# Patient Record
Sex: FEMALE | Race: WHITE | NOT HISPANIC OR LATINO
[De-identification: names, ages, dates, MRNs, and addresses within clinical notes are randomized per-mention and may not be internally consistent; named-entity substitution may affect disease eponyms.]

---

## 2022-10-13 ENCOUNTER — TRANSCRIPTION ENCOUNTER (OUTPATIENT)
Age: 72
End: 2022-10-13

## 2022-10-14 PROBLEM — Z00.00 ENCOUNTER FOR PREVENTIVE HEALTH EXAMINATION: Status: ACTIVE | Noted: 2022-10-14

## 2022-10-26 ENCOUNTER — APPOINTMENT (OUTPATIENT)
Dept: PAIN MANAGEMENT | Facility: CLINIC | Age: 72
End: 2022-10-26

## 2022-10-26 VITALS
HEIGHT: 67 IN | WEIGHT: 123 LBS | BODY MASS INDEX: 19.3 KG/M2 | OXYGEN SATURATION: 99 % | SYSTOLIC BLOOD PRESSURE: 122 MMHG | DIASTOLIC BLOOD PRESSURE: 67 MMHG | HEART RATE: 95 BPM

## 2022-10-26 PROCEDURE — 99214 OFFICE O/P EST MOD 30 MIN: CPT

## 2022-10-26 NOTE — REVIEW OF SYSTEMS
[Back Pain] : back pain [Neck Pain] : neck pain [Decreased ROM] : decreased range of motion [Weakness] : weakness [Negative] : Heme/Lymph

## 2022-10-26 NOTE — PHYSICAL EXAM
[de-identified] : General: Well-developed and well-nourished.  No acute distress.\par Psychiatric: Behavior was cooperative \par Head:  Normocephalic and atraumatic\par Eyes:  Sclera white. Conjunctiva and eyelids pink and moist without discharge.\par Cardiovascular:  Regular\par Respiratory:  Trachea midline. Normal effort.\par No accessory muscle use with respiration\par Abdomen: Non distended, soft and nontender\par Skin:  No rashes, ulcers, or lesions appreciated.\par Back  here is pain with extension,   painful abd limited ROM\par Extremities: No edema \par Musculoskeletal: Moving all extremities freely\par SLR positive RIGHT\par Neuro: CN 2-12 Grossly intact\par Sensation to light touch is intact extremities\par Gait: Ambulates with RW assistive device.

## 2022-10-26 NOTE — ASSESSMENT
[FreeTextEntry1] : Ms. TORRES BARRY is a 72 year F suffering from low back and right pain, that based upon today's subjective complaints, physical examination, and imaging review, is likely secondary to lumbar radiculopahy\par \par >> Medications\par \par Chronic opioid use for non-malignant pain, in particular at high doses would not be recommended since it can potentially lead to hyperalgesia (hypersensitivity), tolerance and addiction. \par  \par Gabapentin 200 mg p.o. with dinner\par \par Continue with Tylenol 650 mg orally every 6 hours as needed \par \par  \par >> Interventions\par \par Arrange for RIGHT L3/4 interlaminar epidural steroid injection under fluoroscopic guidance. Success rate and possible complications discussed with patient in detail. \par \par  \par >> Therapy and Other Modalities\par  \par Continue physical therapy, out of bed as tolerated \par \par >> Imaging and Other Studies\par \par See Media \par \par >> Consults\par \par None ordered

## 2022-10-26 NOTE — HISTORY OF PRESENT ILLNESS
[Back Pain] : back pain [Constant] : constant [4] : a minimum pain level of 4/10 [6] : a maximum pain level of 6/10 [Throbbing] : throbbing [Laying] : laying [FreeTextEntry1] : HPI - Ms. TORRES BARRY is a 72 year F with PHx as below, referred by Dr Dao who presents today with chief complaint of low back and right leg pain pain. Reports that it developed several weeks ago. It is located lumbar spine;  there is radiation of the pain into the ight hip and leg. The pain is presently 7/10 in severity on the numerical rating scale. It is sharp and burning in nature. The pain is constant and progressive. There is diurnal worsening, during the night. The pain is aggravated with standing, walking. The pain improves with rest. The pain is functionally and emotionally disabling for the patient as its preventing them from going about activities of daily living, such as routine housework. The pain does impair the patient’s ability to sleep. \par \par Patient has not been seen by pain management in the past.\par Patient attests to 6 weeks of provider directed treatment, including Physical Therapy at [facility]\par Patient reports treatment that occurred within the last 3 months\par Patient reports that symptoms have been persistent and and progressing after treatment\par  \par Reports there is no present numbness, there s no weakness. Patient denies any bowel/bladder incontinence, no saddle/perineal anesthesia or any other red flag signs or symptoms. Reports regular BMs.\par   [FreeTextEntry2] : 18 [FreeTextEntry7] : Referred by [facility] ED.  Patient presents with right back and neck pain. [de-identified] : tingling [FreeTextEntry3] : MOVING [FreeTextEntry4] : PT

## 2022-10-26 NOTE — DATA REVIEWED
[FreeTextEntry1] : \par \par Radiology \par CT Scan \par CT lumbar spine without contrast \par \par INDICATION: Lumbar back pain \par \par TECHNIQUE: CT examination lumbar spine performed without contrast. Multiple \par axial images obtained from the thoracolumbar junction through the sacrum. \par Sagittal/coronal reformatted images were acquired. Images reviewed in soft \par tissue and bone windows. \par \par COMPARISON: None available \par \par  FINDINGS: \par \par \par For the purposes of this report, 5 nonrib-bearing lumbar-type vertebral bodies \par are present in the inferior most well-formed intervertebral disc space will be \par designated as L5-S1. Vertebral body denoted as L5 is at the level of the \par iliolumbar ligament. No CT evidence for transitional lumbosacral anatomy. \par \par Lumbar lordosis is maintained. Moderate levocurvature centered at mid lumbar \par spine. There is right lateral listhesis of L1-L2, left lateral listhesis of \par L4-L5. Lumbar vertebral body heights are maintained. Multilevel moderate \par degenerative endplate changes with mild endplate sclerosis, multilevel \par Schmorl's nodes, intervertebral disc space narrowing. No evidence of \par spondylolysis. There are partially imaged hypodense lesions within the \par kidneys, nonspecific incompletely characterized may reflect benign cystic \par lesions. Scattered atherosclerotic calcifications of the abdominal aorta. No \par acute fracture identified and lumbar spine. \par \par Multilevel findings as follows: \par \par T11-12: No significant osseous canal or foraminal stenosis. \par \par T12-L1: No significant osseous canal or foraminal stenosis. \par \par L1-L2: No significant osseous canal or foraminal stenosis. \par \par L2-L3: No significant osseous canal or foraminal stenosis. \par \par L3-L4: Disc bulge, bilateral facet arthropathy, ligamentous hypertrophy \par contributing to mild to moderate canal stenosis and mild to moderate bilateral \par foraminal stenosis. \par \par L4-L5: Disc bulge, bilateral facet arthropathy, ligamentous hypertrophy \par contributing to moderate canal stenosis and moderate to severe bilateral \par foraminal stenosis. \par \par L5-S1: Disc bulge, bilateral facet arthropathy, ligamentous hypertrophy \par contributing to no significant canal stenosis and moderate bilateral foraminal \par stenosis. \par \par IMPRESSION: \par \par No acute fracture identified in the lumbar spine. \par \par Multilevel spondylitic changes of the lumbar spine as detailed above notable \par for moderate canal stenosis at L4-L5 and moderate to severe bilateral \par foraminal stenosis at L4-L5 and L5-S1.  rolling walker (5 inch wheels)

## 2022-11-03 ENCOUNTER — APPOINTMENT (OUTPATIENT)
Dept: PAIN MANAGEMENT | Facility: HOSPITAL | Age: 72
End: 2022-11-03

## 2022-11-03 ENCOUNTER — TRANSCRIPTION ENCOUNTER (OUTPATIENT)
Age: 72
End: 2022-11-03

## 2022-11-23 ENCOUNTER — APPOINTMENT (OUTPATIENT)
Dept: PAIN MANAGEMENT | Facility: CLINIC | Age: 72
End: 2022-11-23

## 2022-11-23 VITALS
BODY MASS INDEX: 19.46 KG/M2 | WEIGHT: 124 LBS | SYSTOLIC BLOOD PRESSURE: 107 MMHG | DIASTOLIC BLOOD PRESSURE: 69 MMHG | HEART RATE: 82 BPM | HEIGHT: 67 IN | OXYGEN SATURATION: 98 %

## 2022-11-23 PROCEDURE — 99214 OFFICE O/P EST MOD 30 MIN: CPT

## 2022-11-23 NOTE — DATA REVIEWED
[FreeTextEntry1] : \par \par Radiology \par CT Scan \par CT lumbar spine without contrast \par \par INDICATION: Lumbar back pain \par \par TECHNIQUE: CT examination lumbar spine performed without contrast. Multiple \par axial images obtained from the thoracolumbar junction through the sacrum. \par Sagittal/coronal reformatted images were acquired. Images reviewed in soft \par tissue and bone windows. \par \par COMPARISON: None available \par \par  FINDINGS: \par \par \par For the purposes of this report, 5 nonrib-bearing lumbar-type vertebral bodies \par are present in the inferior most well-formed intervertebral disc space will be \par designated as L5-S1. Vertebral body denoted as L5 is at the level of the \par iliolumbar ligament. No CT evidence for transitional lumbosacral anatomy. \par \par Lumbar lordosis is maintained. Moderate levocurvature centered at mid lumbar \par spine. There is right lateral listhesis of L1-L2, left lateral listhesis of \par L4-L5. Lumbar vertebral body heights are maintained. Multilevel moderate \par degenerative endplate changes with mild endplate sclerosis, multilevel \par Schmorl's nodes, intervertebral disc space narrowing. No evidence of \par spondylolysis. There are partially imaged hypodense lesions within the \par kidneys, nonspecific incompletely characterized may reflect benign cystic \par lesions. Scattered atherosclerotic calcifications of the abdominal aorta. No \par acute fracture identified and lumbar spine. \par \par Multilevel findings as follows: \par \par T11-12: No significant osseous canal or foraminal stenosis. \par \par T12-L1: No significant osseous canal or foraminal stenosis. \par \par L1-L2: No significant osseous canal or foraminal stenosis. \par \par L2-L3: No significant osseous canal or foraminal stenosis. \par \par L3-L4: Disc bulge, bilateral facet arthropathy, ligamentous hypertrophy \par contributing to mild to moderate canal stenosis and mild to moderate bilateral \par foraminal stenosis. \par \par L4-L5: Disc bulge, bilateral facet arthropathy, ligamentous hypertrophy \par contributing to moderate canal stenosis and moderate to severe bilateral \par foraminal stenosis. \par \par L5-S1: Disc bulge, bilateral facet arthropathy, ligamentous hypertrophy \par contributing to no significant canal stenosis and moderate bilateral foraminal \par stenosis. \par \par IMPRESSION: \par \par No acute fracture identified in the lumbar spine. \par \par Multilevel spondylitic changes of the lumbar spine as detailed above notable \par for moderate canal stenosis at L4-L5 and moderate to severe bilateral \par foraminal stenosis at L4-L5 and L5-S1.

## 2022-11-23 NOTE — ASSESSMENT
[FreeTextEntry1] : Ms. TORRES BARRY is a 72 year F suffering from low back and right pain, that based upon today's subjective complaints, physical examination, and imaging review, is likely secondary to lumbar radiculopahy\par \par >> Medications\par \par Chronic opioid use for non-malignant pain, in particular at high doses would not be recommended since it can potentially lead to hyperalgesia (hypersensitivity), tolerance and addiction. \par  \par Gabapentin 200 mg p.o. with dinner\par \par Continue with Tylenol 650 mg orally every 6 hours as needed \par \par  \par >> Interventions\par \par Good relief following JEREMY earlier this month\par  \par >> Therapy and Other Modalities\par  \par Continue physical therapy, out of bed as tolerated \par \par >> Imaging and Other Studies\par \par See Media \par \par >> Consults\par \par None ordered

## 2022-11-23 NOTE — HISTORY OF PRESENT ILLNESS
[Back Pain] : back pain [7] : a current pain level of 7/10 [FreeTextEntry1] : Interval Note:\par \par Since last visit the pain intensity has improved significantly\par \par S/p RIGHT L3/4 ILESI November 3rd 2022 with report of excellent relief (80%)\par \par Since then has been carrying on with PT\par \par Medication has been allowing patient to go about activities of daily living with less pain.\par \par Moving bowels regularly. No recent falls. \par \par Denies weakness, numbness. Patient denies any bowel/bladder incontinence, no saddle/perineal anesthesia or any other red flag signs or symptoms.\par \par \par \par \par --HPI - Ms. TORRES BARRY is a 72 year F with PHx as below, referred by Dr Dao who presents today with chief complaint of low back and right leg pain pain. Reports that it developed several weeks ago. It is located lumbar spine;  there is radiation of the pain into the ight hip and leg. The pain is presently 7/10 in severity on the numerical rating scale. It is sharp and burning in nature. The pain is constant and progressive. There is diurnal worsening, during the night. The pain is aggravated with standing, walking. The pain improves with rest. The pain is functionally and emotionally disabling for the patient as its preventing them from going about activities of daily living, such as routine housework. The pain does impair the patient’s ability to sleep. \par \par Patient has not been seen by pain management in the past.\par Patient attests to 6 weeks of provider directed treatment, [facility]\par Patient reports treatment that occurred within the last 3 months\par Patient reports that symptoms have been persistent and and progressing after treatment\par  \par Reports there is no present numbness, there s no weakness. Patient denies any bowel/bladder incontinence, no saddle/perineal anesthesia or any other red flag signs or symptoms. Reports regular BMs.\par

## 2022-11-23 NOTE — PHYSICAL EXAM
[de-identified] : General: Well-developed and well-nourished.  No acute distress.\par Psychiatric: Behavior was cooperative \par Head:  Normocephalic and atraumatic\par Eyes:  Sclera white. Conjunctiva and eyelids pink and moist without discharge.\par Cardiovascular:  Regular\par Respiratory:  Trachea midline. Normal effort.\par No accessory muscle use with respiration\par Abdomen: Non distended, soft and nontender\par Skin:  No rashes, ulcers, or lesions appreciated.\par Back  here is pain with extension,   painful abd limited ROM\par Extremities: No edema \par Musculoskeletal: Moving all extremities freely \par Neuro: CN 2-12 Grossly intact\par Sensation to light touch is intact extremities\par Gait: Ambulates with RW assistive device.

## 2022-11-23 NOTE — REVIEW OF SYSTEMS
[Back Pain] : back pain [Muscle Pain] : muscle pain [Radiating Pain] : radiating pain [Joint Stiffness] : joint stiffness [Negative] : Heme/Lymph

## 2022-12-06 ENCOUNTER — TRANSCRIPTION ENCOUNTER (OUTPATIENT)
Age: 72
End: 2022-12-06

## 2023-01-04 ENCOUNTER — APPOINTMENT (OUTPATIENT)
Dept: PAIN MANAGEMENT | Facility: CLINIC | Age: 73
End: 2023-01-04
Payer: MEDICARE

## 2023-01-04 VITALS
HEART RATE: 95 BPM | SYSTOLIC BLOOD PRESSURE: 108 MMHG | OXYGEN SATURATION: 99 % | BODY MASS INDEX: 19.62 KG/M2 | WEIGHT: 125 LBS | HEIGHT: 67 IN | DIASTOLIC BLOOD PRESSURE: 54 MMHG

## 2023-01-04 PROCEDURE — 99213 OFFICE O/P EST LOW 20 MIN: CPT

## 2023-01-04 NOTE — HISTORY OF PRESENT ILLNESS
[Back Pain] : back pain [FreeTextEntry1] : Interval Note:\par \par Since last visit the pain intensity has improved significantly\par \par Moving bowels regularly. No recent falls. \par \par Denies weakness, numbness. Patient denies any bowel/bladder incontinence, no saddle/perineal anesthesia or any other red flag signs or symptoms.\par \par --\par S/p RIGHT L3/4 ILESI November 3rd 2022 with report of excellent relief (80%)\par --\par \par \par --HPI - Ms. TORRES BARRY is a 72 year F with PHx as below, referred by Dr Dao who presents today with chief complaint of low back and right leg pain pain. Reports that it developed several weeks ago. It is located lumbar spine;  there is radiation of the pain into the ight hip and leg. The pain is presently 7/10 in severity on the numerical rating scale. It is sharp and burning in nature. The pain is constant and progressive. There is diurnal worsening, during the night. The pain is aggravated with standing, walking. The pain improves with rest. The pain is functionally and emotionally disabling for the patient as its preventing them from going about activities of daily living, such as routine housework. The pain does impair the patient’s ability to sleep. \par \par Patient has not been seen by pain management in the past.\par Patient attests to 6 weeks of provider directed treatment, including Physical Therapy at [facility]\par Patient reports treatment that occurred within the last 3 months\par Patient reports that symptoms have been persistent and and progressing after treatment\par  \par Reports there is no present numbness, there s no weakness. Patient denies any bowel/bladder incontinence, no saddle/perineal anesthesia or any other red flag signs or symptoms. Reports regular BMs.\par

## 2023-01-04 NOTE — ASSESSMENT
[FreeTextEntry1] : Ms. TORRES BARRY is a 72 year F suffering from low back and right pain, that based upon today's subjective complaints, physical examination, and imaging review, is likely secondary to lumbar radiculopahy\par \par >> Medications\par \par Chronic opioid use for non-malignant pain, in particular at high doses would not be recommended since it can potentially lead to hyperalgesia (hypersensitivity), tolerance and addiction. \par  \par Gabapentin 200 mg p.o. with dinner\par \par Continue with Tylenol 650 mg orally every 6 hours as needed \par \par  \par >> Interventions\par None indicated at this time, consider repeat epidural steroid injection if pain recurs.\par \par  \par >> Therapy and Other Modalities\par  \par Continue physical therapy, out of bed as tolerated \par \par >> Imaging and Other Studies\par \par See Media \par \par >> Consults\par \par None ordered

## 2023-01-04 NOTE — REVIEW OF SYSTEMS
[Back Pain] : back pain [Radiating Pain] : radiating pain [Joint Stiffness] : joint stiffness [Decreased ROM] : decreased range of motion [Negative] : Heme/Lymph

## 2023-01-04 NOTE — PHYSICAL EXAM
[de-identified] : General: Well-developed and well-nourished.  No acute distress.\par Psychiatric: Behavior was cooperative \par Head:  Normocephalic and atraumatic\par Eyes:  Sclera white. Conjunctiva and eyelids pink and moist without discharge.\par Cardiovascular:  Regular\par Respiratory:  Trachea midline. Normal effort.\par No accessory muscle use with respiration\par Abdomen: Non distended, soft and nontender\par Skin:  No rashes, ulcers, or lesions appreciated.\par Back range of motion improved, within normal limits\par Extremities: No edema \par Musculoskeletal: Moving all extremities freely \par Neuro: CN 2-12 Grossly intact\par Sensation to light touch is intact extremities\par Gait: Ambulates with NO assistive device.

## 2023-05-17 ENCOUNTER — APPOINTMENT (OUTPATIENT)
Dept: PAIN MANAGEMENT | Facility: CLINIC | Age: 73
End: 2023-05-17
Payer: MEDICARE

## 2023-05-17 VITALS
BODY MASS INDEX: 19.62 KG/M2 | SYSTOLIC BLOOD PRESSURE: 116 MMHG | OXYGEN SATURATION: 95 % | HEART RATE: 85 BPM | DIASTOLIC BLOOD PRESSURE: 68 MMHG | WEIGHT: 125 LBS | HEIGHT: 67 IN

## 2023-05-17 PROCEDURE — 99214 OFFICE O/P EST MOD 30 MIN: CPT

## 2023-05-17 NOTE — ASSESSMENT
[FreeTextEntry1] : Ms. TORRES BARRY is a 72 year F suffering from low back and right pain, that based upon today's subjective complaints, physical examination, and imaging review, is likely secondary to lumbar radiculopathy\par \par >> Medications\par \par Chronic opioid use for non-malignant pain, in particular at high doses would not be recommended since it can potentially lead to hyperalgesia (hypersensitivity), tolerance and addiction. \par  \par Gabapentin 200 mg p.o. with dinner\par \par Continue with Tylenol 650 mg orally every 6 hours as needed \par \par  \par >> Interventions\par \par Arrange for Right L3/4 interlaminar epidural steroid injection under fluoroscopic guidance. Success rate and possible complications discussed with patient in detail. \par  \par >> Therapy and Other Modalities\par  \par Continue physical therapy, out of bed as tolerated \par \par >> Imaging and Other Studies\par \par See Media \par \par >> Consults\par \par None ordered

## 2023-05-17 NOTE — PHYSICAL EXAM
[de-identified] : General: Well-developed and well-nourished.  No acute distress.\par Psychiatric: Behavior was cooperative \par Head:  Normocephalic and atraumatic\par Eyes:  Sclera white. Conjunctiva and eyelids pink and moist without discharge.\par Cardiovascular:  Regular\par Respiratory:  Trachea midline. Normal effort.\par No accessory muscle use with respiration\par Abdomen: Non distended, soft and nontender\par Skin:  No rashes, ulcers, or lesions appreciated.\par Back range of motion improved, within normal limits\par Extremities: No edema \par Musculoskeletal: Moving all extremities freely \par Neuro: CN 2-12 Grossly intact\par Sensation to light touch is intact extremities\par Gait: Ambulates with NO assistive device.

## 2023-05-17 NOTE — HISTORY OF PRESENT ILLNESS
[0] : a current pain level of 0/10 [FreeTextEntry1] : Interval Note:\par \par Since last visit the pain intensity has started to recur in the right lumbar spine down right leg at times\par \par Moving bowels regularly. No recent falls. \par \par Occasionally suffering from paresthesia in her legs\par \par  Patient denies any bowel/bladder incontinence, no saddle/perineal anesthesia or any other red flag signs or symptoms.\par \par --\par S/p RIGHT L3/4 ILESI November 3rd 2022 with report of excellent relief (80%)\par --\par \par \par --HPI - Ms. TORRES BARRY is a 72 year F with PHx as below, referred by Dr Dao who presents today with chief complaint of low back and right leg pain pain. Reports that it developed several weeks ago. It is located lumbar spine;  there is radiation of the pain into the ight hip and leg. The pain is presently 7/10 in severity on the numerical rating scale. It is sharp and burning in nature. The pain is constant and progressive. There is diurnal worsening, during the night. The pain is aggravated with standing, walking. The pain improves with rest. The pain is functionally and emotionally disabling for the patient as its preventing them from going about activities of daily living, such as routine housework. The pain does impair the patient’s ability to sleep. \par \par Patient has not been seen by pain management in the past.\par Patient attests to 6 weeks of provider directed treatment, [facility]\par Patient reports treatment that occurred within the last 3 months\par Patient reports that symptoms have been persistent and and progressing after treatment\par  \par Reports there is no present numbness, there s no weakness. Patient denies any bowel/bladder incontinence, no saddle/perineal anesthesia or any other red flag signs or symptoms. Reports regular BMs.\par

## 2023-05-17 NOTE — REVIEW OF SYSTEMS
[Back Pain] : back pain [Muscle Pain] : muscle pain [Radiating Pain] : radiating pain [Decreased ROM] : decreased range of motion [Negative] : Heme/Lymph

## 2023-06-01 ENCOUNTER — TRANSCRIPTION ENCOUNTER (OUTPATIENT)
Age: 73
End: 2023-06-01

## 2023-06-01 ENCOUNTER — APPOINTMENT (OUTPATIENT)
Dept: PAIN MANAGEMENT | Facility: HOSPITAL | Age: 73
End: 2023-06-01

## 2023-07-07 ENCOUNTER — APPOINTMENT (OUTPATIENT)
Dept: PAIN MANAGEMENT | Facility: CLINIC | Age: 73
End: 2023-07-07
Payer: MEDICARE

## 2023-07-07 VITALS
DIASTOLIC BLOOD PRESSURE: 69 MMHG | HEIGHT: 67 IN | SYSTOLIC BLOOD PRESSURE: 147 MMHG | HEART RATE: 91 BPM | OXYGEN SATURATION: 98 % | WEIGHT: 125 LBS | BODY MASS INDEX: 19.62 KG/M2

## 2023-07-07 PROCEDURE — 99213 OFFICE O/P EST LOW 20 MIN: CPT

## 2023-07-07 NOTE — REVIEW OF SYSTEMS
[Back Pain] : back pain [Muscle Pain] : muscle pain [Decreased ROM] : decreased range of motion [Negative] : Heme/Lymph

## 2023-07-07 NOTE — HISTORY OF PRESENT ILLNESS
[0] : a current pain level of 0/10 [Back Pain] : back pain [FreeTextEntry1] : Interval Note:\par \par Patient underwent right-sided L3-4 interlaminar epidural steroid injection on June 1, 2023 with report of 95 % relief - expressing gratitude\par \par Occasionally suffering from paresthesia in her legs\par \par  Patient denies any bowel/bladder incontinence, no saddle/perineal anesthesia or any other red flag signs or symptoms.\par \par --\par S/p RIGHT L3/4 ILESI November 3rd 2022 with report of excellent relief (80%)\par --\par \par \par --HPI - Ms. TORRES BARRY is a 72 year F with PHx as below, referred by Dr Dao who presents today with chief complaint of low back and right leg pain pain. Reports that it developed several weeks ago. It is located lumbar spine;  there is radiation of the pain into the ight hip and leg. The pain is presently 7/10 in severity on the numerical rating scale. It is sharp and burning in nature. The pain is constant and progressive. There is diurnal worsening, during the night. The pain is aggravated with standing, walking. The pain improves with rest. The pain is functionally and emotionally disabling for the patient as its preventing them from going about activities of daily living, such as routine housework. The pain does impair the patient’s ability to sleep. \par \par Patient has not been seen by pain management in the past.\par Patient attests to 6 weeks of provider directed [facility]\par Patient reports treatment that occurred within the last 3 months\par Patient reports that symptoms have been persistent and and progressing after treatment\par  \par Reports there is no present numbness, there s no weakness. Patient denies any bowel/bladder incontinence, no saddle/perineal anesthesia or any other red flag signs or symptoms. Reports regular BMs.\par

## 2023-07-07 NOTE — ASSESSMENT
[FreeTextEntry1] : Ms. TORRES BARRY is a 72 year F suffering from low back and right pain, that based upon today's subjective complaints, physical examination, and imaging review, is likely secondary to lumbar radiculopathy\par \par >> Medications\par \par Chronic opioid use for non-malignant pain, in particular at high doses would not be recommended since it can potentially lead to hyperalgesia (hypersensitivity), tolerance and addiction. \par  \par Gabapentin 200 mg p.o. with dinner\par \par Continue with Tylenol 650 mg orally every 6 hours as needed \par  \par >> Interventions\par \par Excellent relief following JEREMY\par  \par >> Therapy and Other Modalities\par  \par Continue physical therapy, out of bed as tolerated \par \par >> Imaging and Other Studies\par \par See Media \par \par >> Consults\par \par None ordered

## 2023-07-07 NOTE — PHYSICAL EXAM
[de-identified] : General: Well-developed and well-nourished.  No acute distress.\par Psychiatric: Behavior was cooperative \par Head:  Normocephalic and atraumatic\par Eyes:  Sclera white. Conjunctiva and eyelids pink and moist without discharge.\par Cardiovascular:  Regular\par Respiratory:  Trachea midline. Normal effort.\par No accessory muscle use with respiration\par Abdomen: Non distended, soft and nontender\par Skin:  No rashes, ulcers, or lesions appreciated.\par Back range of motion improved, within normal limits\par Extremities: No edema \par Musculoskeletal: Moving all extremities freely \par Neuro: CN 2-12 Grossly intact\par Sensation to light touch is intact extremities\par Gait: Ambulates with NO assistive device.

## 2023-09-19 RX ORDER — GABAPENTIN 100 MG/1
100 CAPSULE ORAL
Qty: 60 | Refills: 2 | Status: ACTIVE | COMMUNITY
Start: 2022-10-26 | End: 1900-01-01

## 2023-09-19 RX ORDER — BACLOFEN 5 MG/1
5 TABLET ORAL
Qty: 25 | Refills: 2 | Status: ACTIVE | COMMUNITY
Start: 2022-10-26 | End: 1900-01-01

## 2023-10-04 ENCOUNTER — APPOINTMENT (OUTPATIENT)
Dept: PAIN MANAGEMENT | Facility: CLINIC | Age: 73
End: 2023-10-04
Payer: MEDICARE

## 2023-10-04 VITALS
BODY MASS INDEX: 19.93 KG/M2 | DIASTOLIC BLOOD PRESSURE: 71 MMHG | SYSTOLIC BLOOD PRESSURE: 124 MMHG | HEART RATE: 90 BPM | HEIGHT: 67 IN | WEIGHT: 127 LBS | OXYGEN SATURATION: 96 %

## 2023-10-04 DIAGNOSIS — M48.061 SPINAL STENOSIS, LUMBAR REGION WITHOUT NEUROGENIC CLAUDICATION: ICD-10-CM

## 2023-10-04 PROCEDURE — 99213 OFFICE O/P EST LOW 20 MIN: CPT

## 2024-01-17 ENCOUNTER — APPOINTMENT (OUTPATIENT)
Dept: PAIN MANAGEMENT | Facility: CLINIC | Age: 74
End: 2024-01-17
Payer: MEDICARE

## 2024-01-17 VITALS
SYSTOLIC BLOOD PRESSURE: 107 MMHG | BODY MASS INDEX: 19.93 KG/M2 | HEART RATE: 81 BPM | HEIGHT: 67 IN | DIASTOLIC BLOOD PRESSURE: 67 MMHG | WEIGHT: 127 LBS | OXYGEN SATURATION: 99 %

## 2024-01-17 DIAGNOSIS — M54.16 RADICULOPATHY, LUMBAR REGION: ICD-10-CM

## 2024-01-17 DIAGNOSIS — M79.10 MYALGIA, UNSPECIFIED SITE: ICD-10-CM

## 2024-01-17 PROCEDURE — G2211 COMPLEX E/M VISIT ADD ON: CPT

## 2024-01-17 PROCEDURE — 99213 OFFICE O/P EST LOW 20 MIN: CPT

## 2024-01-17 RX ORDER — GABAPENTIN 100 MG/1
100 CAPSULE ORAL
Qty: 120 | Refills: 3 | Status: ACTIVE | COMMUNITY
Start: 2023-10-04 | End: 1900-01-01

## 2024-01-17 NOTE — PHYSICAL EXAM
[de-identified] : General: AAOx3, NAD HEENT: EOMI, Sclera white CVS: +2 Pulses Pulmonary: No Respiratory Distress Abdomen: Soft, NT, ND MSK: Moving all extremities against gravity Neuro: Sensation I to LT Extremities: (-)Edema Derm: No Rash Psych: Calm, Cooperative

## 2024-01-17 NOTE — ASSESSMENT
[FreeTextEntry1] : Ms. TORRES BARRY is a 73 year F suffering from low back and right pain, that based upon today's subjective complaints, physical examination, and imaging review, is likely secondary to lumbar radiculopathy  >> Medications  Chronic opioid use for non-malignant pain, in particular at high doses would not be recommended since it can potentially lead to hyperalgesia (hypersensitivity), tolerance and addiction.  Gabapentin 200 mg p.o. with dinner - titrate to four capsules as tolerated  Continue with Tylenol 650 mg orally every 6 hours as needed   >> Interventions  Consider for Right L3/4 interlaminar epidural steroid injection under fluoroscopic guidance. Success rate and possible complications discussed with patient in detail.  >> Therapy and Other Modalities  Continue physical therapy, out of bed as tolerated  >> Imaging and Other Studies  See Media  >> Consults  None ordered.  Based upon current evaluation and management, I will be providing ongoing longitudinal care for this complex painful condition, described above.  Patient is encouraged to contact me with any questions or concerns.

## 2024-01-17 NOTE — HISTORY OF PRESENT ILLNESS
[Back Pain] : back pain [FreeTextEntry1] : Interval Note:  Pain control has been excellent, however she is still experiencing itching and paresthesia at times, worse at night  Walking 1 mile a day  Back pain minimal -  Patient denies any bowel/bladder incontinence, no saddle/perineal anesthesia or any other red flag signs or symptoms.  --  Right-sided L3-4 interlaminar epidural steroid injection on June 1, 2023 with report of 95 % relief - expressing gratitude  S/p RIGHT L3/4 ILESI November 3rd 2022 with report of excellent relief (80%) --   --HPI - Ms. TORRES BARRY is a 72 year F with PHx as below, referred by Dr Dao who presents today with chief complaint of low back and right leg pain pain. Reports that it developed several weeks ago. It is located lumbar spine;  there is radiation of the pain into the ight hip and leg. The pain is presently 7/10 in severity on the numerical rating scale. It is sharp and burning in nature. The pain is constant and progressive. There is diurnal worsening, during the night. The pain is aggravated with standing, walking. The pain improves with rest. The pain is functionally and emotionally disabling for the patient as its preventing them from going about activities of daily living, such as routine housework. The pain does impair the patient's ability to sleep.   Patient has not been seen by pain management in the past. Patient attests to 6 weeks of provider directed treatment, including Physical Therapy at [facility] Patient reports treatment that occurred within the last 3 months Patient reports that symptoms have been persistent and and progressing after treatment   Reports there is no present numbness, there s no weakness. Patient denies any bowel/bladder incontinence, no saddle/perineal anesthesia or any other red flag signs or symptoms. Reports regular BMs.

## 2024-07-15 ENCOUNTER — APPOINTMENT (OUTPATIENT)
Dept: PAIN MANAGEMENT | Facility: CLINIC | Age: 74
End: 2024-07-15
Payer: MEDICARE

## 2024-07-15 VITALS
DIASTOLIC BLOOD PRESSURE: 53 MMHG | BODY MASS INDEX: 19.93 KG/M2 | SYSTOLIC BLOOD PRESSURE: 107 MMHG | WEIGHT: 127 LBS | HEART RATE: 67 BPM | OXYGEN SATURATION: 98 % | HEIGHT: 67 IN

## 2024-07-15 DIAGNOSIS — M79.10 MYALGIA, UNSPECIFIED SITE: ICD-10-CM

## 2024-07-15 DIAGNOSIS — M48.061 SPINAL STENOSIS, LUMBAR REGION WITHOUT NEUROGENIC CLAUDICATION: ICD-10-CM

## 2024-07-15 PROCEDURE — 99213 OFFICE O/P EST LOW 20 MIN: CPT

## 2025-01-13 ENCOUNTER — APPOINTMENT (OUTPATIENT)
Dept: PAIN MANAGEMENT | Facility: CLINIC | Age: 75
End: 2025-01-13
Payer: MEDICARE

## 2025-01-13 VITALS
WEIGHT: 127 LBS | SYSTOLIC BLOOD PRESSURE: 111 MMHG | OXYGEN SATURATION: 98 % | BODY MASS INDEX: 19.93 KG/M2 | DIASTOLIC BLOOD PRESSURE: 78 MMHG | HEART RATE: 78 BPM | HEIGHT: 67 IN

## 2025-01-13 DIAGNOSIS — M54.16 RADICULOPATHY, LUMBAR REGION: ICD-10-CM

## 2025-01-13 DIAGNOSIS — M79.10 MYALGIA, UNSPECIFIED SITE: ICD-10-CM

## 2025-01-13 PROCEDURE — 99213 OFFICE O/P EST LOW 20 MIN: CPT

## 2025-01-13 RX ORDER — GABAPENTIN 100 MG/1
100 CAPSULE ORAL
Qty: 90 | Refills: 5 | Status: ACTIVE | COMMUNITY
Start: 2025-01-13 | End: 1900-01-01

## 2025-07-07 ENCOUNTER — APPOINTMENT (OUTPATIENT)
Dept: PAIN MANAGEMENT | Facility: CLINIC | Age: 75
End: 2025-07-07
Payer: MEDICARE

## 2025-07-07 VITALS
HEIGHT: 67 IN | WEIGHT: 115 LBS | SYSTOLIC BLOOD PRESSURE: 103 MMHG | DIASTOLIC BLOOD PRESSURE: 53 MMHG | HEART RATE: 88 BPM | BODY MASS INDEX: 18.05 KG/M2 | OXYGEN SATURATION: 95 %

## 2025-07-07 PROCEDURE — 99213 OFFICE O/P EST LOW 20 MIN: CPT
